# Patient Record
Sex: MALE | Race: WHITE
[De-identification: names, ages, dates, MRNs, and addresses within clinical notes are randomized per-mention and may not be internally consistent; named-entity substitution may affect disease eponyms.]

---

## 2018-01-17 ENCOUNTER — HOSPITAL ENCOUNTER (INPATIENT)
Dept: HOSPITAL 62 - ER | Age: 73
LOS: 3 days | Discharge: HOME HEALTH | DRG: 857 | End: 2018-01-20
Attending: SURGERY | Admitting: SURGERY
Payer: MEDICARE

## 2018-01-17 DIAGNOSIS — T81.4XXA: Primary | ICD-10-CM

## 2018-01-17 DIAGNOSIS — Z87.891: ICD-10-CM

## 2018-01-17 DIAGNOSIS — Z80.1: ICD-10-CM

## 2018-01-17 DIAGNOSIS — L02.211: ICD-10-CM

## 2018-01-17 DIAGNOSIS — Z90.49: ICD-10-CM

## 2018-01-17 DIAGNOSIS — Z85.038: ICD-10-CM

## 2018-01-17 DIAGNOSIS — Z93.3: ICD-10-CM

## 2018-01-17 LAB
ADD MANUAL DIFF: NO
ALBUMIN SERPL-MCNC: 3.9 G/DL (ref 3.5–5)
ALP SERPL-CCNC: 83 U/L (ref 38–126)
ALT SERPL-CCNC: 21 U/L (ref 21–72)
ANION GAP SERPL CALC-SCNC: 13 MMOL/L (ref 5–19)
AST SERPL-CCNC: 20 U/L (ref 17–59)
BASOPHILS # BLD AUTO: 0.1 10^3/UL (ref 0–0.2)
BASOPHILS NFR BLD AUTO: 0.5 % (ref 0–2)
BILIRUB DIRECT SERPL-MCNC: 0.3 MG/DL (ref 0–0.4)
BILIRUB SERPL-MCNC: 0.4 MG/DL (ref 0.2–1.3)
BUN SERPL-MCNC: 22 MG/DL (ref 7–20)
CALCIUM: 9.5 MG/DL (ref 8.4–10.2)
CHLORIDE SERPL-SCNC: 100 MMOL/L (ref 98–107)
CO2 SERPL-SCNC: 24 MMOL/L (ref 22–30)
EOSINOPHIL # BLD AUTO: 0 10^3/UL (ref 0–0.6)
EOSINOPHIL NFR BLD AUTO: 0.3 % (ref 0–6)
ERYTHROCYTE [DISTWIDTH] IN BLOOD BY AUTOMATED COUNT: 15.2 % (ref 11.5–14)
GLUCOSE SERPL-MCNC: 109 MG/DL (ref 75–110)
HCT VFR BLD CALC: 36.2 % (ref 37.9–51)
HGB BLD-MCNC: 12.1 G/DL (ref 13.5–17)
LYMPHOCYTES # BLD AUTO: 0.7 10^3/UL (ref 0.5–4.7)
LYMPHOCYTES NFR BLD AUTO: 7.2 % (ref 13–45)
MCH RBC QN AUTO: 27.9 PG (ref 27–33.4)
MCHC RBC AUTO-ENTMCNC: 33.4 G/DL (ref 32–36)
MCV RBC AUTO: 83 FL (ref 80–97)
MONOCYTES # BLD AUTO: 1.2 10^3/UL (ref 0.1–1.4)
MONOCYTES NFR BLD AUTO: 11.4 % (ref 3–13)
NEUTROPHILS # BLD AUTO: 8.4 10^3/UL (ref 1.7–8.2)
NEUTS SEG NFR BLD AUTO: 80.6 % (ref 42–78)
PLATELET # BLD: 324 10^3/UL (ref 150–450)
POTASSIUM SERPL-SCNC: 4.5 MMOL/L (ref 3.6–5)
PROT SERPL-MCNC: 7.2 G/DL (ref 6.3–8.2)
RBC # BLD AUTO: 4.34 10^6/UL (ref 4.35–5.55)
SODIUM SERPL-SCNC: 136.8 MMOL/L (ref 137–145)
TOTAL CELLS COUNTED % (AUTO): 100 %
WBC # BLD AUTO: 10.4 10^3/UL (ref 4–10.5)

## 2018-01-17 PROCEDURE — 36415 COLL VENOUS BLD VENIPUNCTURE: CPT

## 2018-01-17 PROCEDURE — 87040 BLOOD CULTURE FOR BACTERIA: CPT

## 2018-01-17 PROCEDURE — G0378 HOSPITAL OBSERVATION PER HR: HCPCS

## 2018-01-17 PROCEDURE — 87077 CULTURE AEROBIC IDENTIFY: CPT

## 2018-01-17 PROCEDURE — 80053 COMPREHEN METABOLIC PANEL: CPT

## 2018-01-17 PROCEDURE — 96365 THER/PROPH/DIAG IV INF INIT: CPT

## 2018-01-17 PROCEDURE — 99284 EMERGENCY DEPT VISIT MOD MDM: CPT

## 2018-01-17 PROCEDURE — 96375 TX/PRO/DX INJ NEW DRUG ADDON: CPT

## 2018-01-17 PROCEDURE — 93005 ELECTROCARDIOGRAM TRACING: CPT

## 2018-01-17 PROCEDURE — 87075 CULTR BACTERIA EXCEPT BLOOD: CPT

## 2018-01-17 PROCEDURE — 85025 COMPLETE CBC W/AUTO DIFF WBC: CPT

## 2018-01-17 PROCEDURE — 87070 CULTURE OTHR SPECIMN AEROBIC: CPT

## 2018-01-17 PROCEDURE — 93010 ELECTROCARDIOGRAM REPORT: CPT

## 2018-01-17 PROCEDURE — 87186 SC STD MICRODIL/AGAR DIL: CPT

## 2018-01-17 PROCEDURE — 74177 CT ABD & PELVIS W/CONTRAST: CPT

## 2018-01-17 PROCEDURE — 87205 SMEAR GRAM STAIN: CPT

## 2018-01-17 NOTE — ER DOCUMENT REPORT
ED Wound





- General


Chief Complaint: Incision Problem


Stated Complaint: PAIN/REDNESS AT SURGICAL SITE


Time Seen by Provider: 01/17/18 18:47


Mode of Arrival: Wheelchair


Information source: Patient, Relative


Notes: 





Patient is a 72-year-old male who presents to the ER today for redness, pain to 

an incision site where he had mesh removed a month ago at [facility].  Patient 

states that he had a hernia with mesh placed 3 years ago and a "kept getting 

infected" the last month he had the mesh removed.  Patient states he has been 

doing fine until yesterday whenever he had a fever of 102F and started 

vomiting.  He states at that time he did notice that there was some redness and 

pain to the top of the incision on his abdomen.  He denies any drainage.  He 

does also have a permanent colostomy, denies any purulence leaking from that.


TRAVEL OUTSIDE OF THE [place] IN LAST 30 DAYS: No





- Related Data


Allergies/Adverse Reactions: 


 





No Known Allergies Allergy (Verified 01/17/18 18:17)


 











Past Medical History





- General


Information source: Patient, Relative





- Social History


Smoking Status: Former Smoker


Chew tobacco use (# tins/day): No


Frequency of alcohol use: None


Drug Abuse: None


Family History: Reviewed & Not Pertinent


Patient has suicidal ideation: No


Patient has homicidal ideation: No





- Past Medical History


Cardiac Medical History: Reports: Hx Heart Murmur - newly Dx'ed, no MVP per 

echo DR. Candelario 4/25/12


   Denies: Hx Atrial Fibrillation, Hx Congestive Heart Failure, Hx Coronary 

Artery Disease, Hx Heart Attack, Hx Hypercholesterolemia, Hx Hypertension, Hx 

Peripheral Vascular Disease


Pulmonary Medical History: 


   Denies: Hx Asthma, Hx Tuberculosis


Neurological Medical History: Denies: Hx Cerebrovascular Accident, Hx Seizures


Renal/ Medical History: Denies: Hx Peritoneal Dialysis


Malignancy Medical History: Reports Hx Colorectal Cancer, Denies Hx Leukemia


GI Medical History: Denies: Hx Crohn's Disease, Hx Gastroesophageal Reflux 

Disease, Hx Hepatitis, Hx Hiatal Hernia, Hx Irritable Bowel, Hx Liver Failure, 

Hx Pancreatitis, Hx Ulcer


Musculoskeltal Medical History: Reports Hx Musculoskeletal Deformity, Reports 

Hx Musculoskeletal Trauma


Traumatic Medical History: Reports: Hx Fractures - left femur right wrist


Infectious Medical History: Denies: Hx Hepatitis, Hx HIV


Past Surgical History: Reports: Hx Abdominal Surgery - hernia repair and mesh 

removal, Hx Appendectomy, Hx Bowel Surgery - colostomy, Hx Orthopedic Surgery - 

rt femur left wrist.  Denies: Hx Cholecystectomy, Hx Colostomy, Hx Coronary 

Artery Bypass Graft, Hx Gastric Bypass Surgery, Hx Herniorrhaphy, Hx Open Heart 

Surgery, Hx Pacemaker, Hx Tonsillectomy





- Immunizations


Immunizations up to date: Yes


Hx Diphtheria, Pertussis, Tetanus Vaccination: Yes





Review of Systems





- Review of Systems


Constitutional: See HPI


EENT: No symptoms reported


Cardiovascular: No symptoms reported


Respiratory: No symptoms reported


Gastrointestinal: See HPI


Genitourinary: No symptoms reported


Male Genitourinary: No symptoms reported


Musculoskeletal: No symptoms reported


Skin: See HPI


Hematologic/Lymphatic: No symptoms reported


Neurological/Psychological: No symptoms reported





Physical Exam





- Vital signs


Vitals: 


 











Temp Pulse Resp BP Pulse Ox


 


 102.1 F H  140 H  20   123/76   94 


 


 01/17/18 18:22  01/17/18 18:22  01/17/18 18:22  01/17/18 18:22  01/17/18 18:22














- Notes


Notes: 





PHYSICAL EXAMINATION: 


GENERAL: Well-appearing and in no acute distress. 


HEAD: Atraumatic, normocephalic. 


EYES: Pupils equal round and reactive to light, extraocular movements intact, 

sclera anicteric, conjunctiva are normal. 


NECK: Normal range of motion, supple without lymphadenopathy 


LUNGS: CTAB and equal. No wheezes rales or rhonchi. 


HEART: Regular rate and rhythm without murmurs


ABDOMEN: Soft, large vertical healed incision site to the mid abdomen, superior 

portion with erythema surrounding and small, 1-1/2 cm fluctuant area, tender to 

palpation. No guarding, no rebound 


EXTREMITIES: Normal range of motion, no pitting edema. No cyanosis. 


NEUROLOGICAL: Cranial nerves grossly intact. Normal sensory/motor exams. 


PSYCH: Normal mood, normal affect. 


SKIN: Warm, Dry, normal turgor, no rashes or lesions noted 

















Course





- Re-evaluation


Re-evalutation: 





01/17/18 22:43


Lab work is unremarkable including a normal white blood cell count.  Patient's 

abscess did burst on its own here in the emergency department and a large 

amount of purulence drained.  CAT scan reports a deep abdominal wall abscess, 

but not into the abdominal cavity.  Dr. Goetz, surgeon on-call did evaluate 

the patient at bedside with me, and wants to take him to the OR for a surgical 

incision and drainage.  Patient's fever did reduce here with Tylenol and 

patient did start vomiting here but Zofran also relieve those symptoms.  I did 

start Unasyn early on before the CAT scan which the surgeon was pleased with. 


01/17/18 22:45








- Vital Signs


Vital signs: 


 











Temp Pulse Resp BP Pulse Ox


 


 102.1 F H  140 H  20   123/76   94 


 


 01/17/18 18:22  01/17/18 18:22  01/17/18 18:22  01/17/18 18:22  01/17/18 18:22














- Laboratory


Result Diagrams: 


 01/17/18 18:52





 01/17/18 18:52


Laboratory results interpreted by me: 


 











  01/17/18 01/17/18





  18:52 18:52


 


RBC  4.34 L 


 


Hgb  12.1 L 


 


Hct  36.2 L 


 


RDW  15.2 H 


 


Seg Neutrophils %  80.6 H 


 


Lymphocytes %  7.2 L 


 


Absolute Neutrophils  8.4 H 


 


Sodium   136.8 L


 


BUN   22 H














Discharge





- Discharge


Clinical Impression: 


 Abdominal wall abscess





Condition: Stable


Disposition: ADMITTED AS INPATIENT


Admitting Provider: Surgicalist


Unit Admitted: OR

## 2018-01-17 NOTE — PDOC H&P
History of Present Illness


Admission Date/PCP: 


  18 22:33





  AMY BUTLER MD





Patient complains of: abdominal wall pains


History of Present Illness: 


BRIANDA EDWARDS is a 72 year old male who had an abdominal wall mesh removed at 

[facility] in . C/O abdominal wall pains with redness at the incision 

site just above the umbilicus. This started draining in the ER. C/S were 

obtained of the drainage.C/O being cold. No fever.








Past Medical History


Cardiac Medical History: Reports: Heart Murmur - newly Dx'ed, no MVP per echo 

DR. Candelario 12


   Denies: Atrial Fibrillation, Congestive Heart Failure, Coronary Artery 

Disease, Myocardial Infarction, Hyperlipidema, Hypertension, Peripheral 

Vascular Disease


Pulmonary Medical History: 


   Denies: Asthma, Tuberculosis


Neurological Medical History: 


   Denies: Seizures


Malignancy Medical History: Reports: Colorectal Cancer


   Denies: Leukemia


GI Medical History: 


   Denies: Crohn's Disease, Gastroesophageal Reflux Disease, Hepatitis, Hiatal 

Hernia


Hematology: Reports: Anemia - required transfusion 12 years ago


   Denies: Hemophilia, Sickle Cell Disease


Infectious Medical History: 


   Denies: HIV





Past Surgical History


Past Surgical History: Reports: Appendectomy, Orthopedic Surgery - rt femur 

left wrist, Other - Permanent colostomy for colon Ca about 15 years ago


   Denies: Cholecystectomy, Colostomy, Coronary Artery Bypass Graft, Gastric 

Bypass Surgery, Herniorrhaphy, Pacemaker, Tonsillectomy





Social History


Smoking Status: Former Smoker


Frequency of Alcohol Use: None


Hx Recreational Drug Use: No


Hx Prescription Drug Abuse: No





Family History


Family History: Reviewed & Not Pertinent


Parental Family History Reviewed: Yes - father  of lung ca/emphysema


Children Family History Reviewed: No


Sibling(s) Family History Reviewed.: No





Medication/Allergy


Home Medications: 








Tramadol HCl [Ultram 50 mg Tablet] 50 mg PO ASDIR PRN #20 tablet 14 


Hydrocodone/Acetaminophen [Norco 5-325 mg Tablet] 1 tab PO Q6HP #14 tablet 10/07

/14 


Ondansetron [Zofran Odt 4 mg Tablet] 1 - 2 tab PO Q4H PRN #15 tab.rapdis 10/07/

14 








Allergies/Adverse Reactions: 


 





No Known Allergies Allergy (Verified 18 18:17)


 











Review of Systems


Constitutional: PRESENT: chills


Eyes: PRESENT: other - no vis/hearing changes


Nose, Mouth, and Throat: PRESENT: other - no sore throat


Cardiovascular: PRESENT: other - no chest pains


Respiratory: PRESENT: other - no cough


Gastrointestinal: PRESENT: abdominal pain, nausea


Genitourinary: PRESENT: other - no dysuria


Musculoskeletal: PRESENT: other - no joint swelling


Integumentary: PRESENT: other - no rash


Neurological: PRESENT: other - no seizures


Endocrine: PRESENT: cold intolerance


Hematologic/Lymphatic: PRESENT: other - no easy bruisability





Physical Exam


Vital Signs: 


 











Temp Pulse Resp BP Pulse Ox


 


 102.1 F H  140 H  20   123/76   94 


 


 18 18:22  18 18:22  18 18:22  18 18:22  18 18:22











General appearance: PRESENT: mild distress


Head exam: PRESENT: atraumatic


Eye exam: PRESENT: conjunctiva pink


Mouth exam: PRESENT: moist, tongue midline


Neck exam: PRESENT: full ROM


Respiratory exam: PRESENT: clear to auscultation mirza


Cardiovascular exam: PRESENT: RRR


Pulses: PRESENT: normal radial pulses


Vascular exam: PRESENT: normal capillary refill


GI/Abdominal exam: PRESENT: soft, tenderness - at the epigastric incision site 

with redness. Has some purulent draining at the midpart of rcchymosis., other - 

colostomy on the LLQ functioning well.Claims has another mesh around colostomy 

site


Rectal exam: PRESENT: deferred


Extremities exam: PRESENT: full ROM


Musculoskeletal exam: PRESENT: ambulatory


Neurological exam: PRESENT: alert, oriented to person, oriented to place, 

oriented to time, oriented to situation


Psychiatric exam: PRESENT: anxious


Skin exam: PRESENT: normal color, warm





Results


Impressions: 


 





Abdomen/Pelvis CT  18 19:44


IMPRESSION:  Broad thick-walled fluid collection within the abdomen along the 

deep surface of the abdominal wall.  Potentially postoperative seroma/ hematoma 

given recent mesh removal, although infection is in the differential.  This 

appears to partially decompressed into the subcutaneous tissues just above the 

level of the umbilicus, possibly draining to the skin surface.


 














Assessment & Plan





- Diagnosis


(1) Abdominal wall abscess


Is this a current diagnosis for this admission?: Yes   





(2) History of colon cancer


Is this a current diagnosis for this admission?: Yes   





- Time


Time Spent: 30 to 50 Minutes





- Inpatient Certification


Medical Necessity: Need For IV Fluids, Need for Pain Control, Need for IV 

Antibiotics, Need for Surgery





- Plan Summary


Plan Summary: 





Start IV antibiotics Unasyn


NPO 


Hydrate


Possible I&D tomorrow.

## 2018-01-17 NOTE — ER DOCUMENT REPORT
ED Medical Screen (RME)





- General


Chief Complaint: Incision Problem


Stated Complaint: PAIN/REDNESS AT SURGICAL SITE


Time Seen by Provider: 01/17/18 18:47


Notes: 





-year-old male patient with past history colon cancer and in the ostomy.  He 

had a ventral hernia with mesh in the past.  The mesh got infected and it was 

removed about 12/1/2017.  He now has an area at the upper part of the scar 

which is red, bulging and fluctuant which began on 1/14/2018.  Also began 

having a fever on that day.








I have greeted and performed a rapid initial assessment of this patient.  A 

comprehensive ED assessment and evaluation of the patient, analysis of test 

results and completion of the medical decision making process will be conducted 

by additional ED providers.


TRAVEL OUTSIDE OF THE [place] IN LAST 30 DAYS: No





- Related Data


Allergies/Adverse Reactions: 


 





No Known Allergies Allergy (Verified 01/17/18 18:17)


 











Past Medical History





- Social History


Chew tobacco use (# tins/day): No


Frequency of alcohol use: None


Drug Abuse: None





- Past Medical History


Cardiac Medical History: Reports: Hx Heart Murmur - newly Dx'ed, no MVP per 

echo DR. Candelario 4/25/12


   Denies: Hx Atrial Fibrillation, Hx Congestive Heart Failure, Hx Coronary 

Artery Disease, Hx Heart Attack, Hx Hypercholesterolemia, Hx Hypertension, Hx 

Peripheral Vascular Disease


Pulmonary Medical History: 


   Denies: Hx Asthma, Hx Tuberculosis


Neurological Medical History: Denies: Hx Cerebrovascular Accident, Hx Seizures


Renal/ Medical History: Denies: Hx Peritoneal Dialysis


Malignancy Medical History: Reports Hx Colorectal Cancer, Denies Hx Leukemia


GI Medical History: Denies: Hx Crohn's Disease, Hx Gastroesophageal Reflux 

Disease, Hx Hepatitis, Hx Hiatal Hernia, Hx Irritable Bowel, Hx Liver Failure, 

Hx Pancreatitis, Hx Ulcer


Musculoskeltal Medical History: Reports Hx Musculoskeletal Deformity, Reports 

Hx Musculoskeletal Trauma


Traumatic Medical History: Reports: Hx Fractures - left femur right wrist


Infectious Medical History: Denies: Hx Hepatitis, Hx HIV


Past Surgical History: Reports: Hx Abdominal Surgery - hernia repair and mesh 

removal, Hx Appendectomy, Hx Bowel Surgery - colostomy, Hx Orthopedic Surgery - 

rt femur left wrist.  Denies: Hx Cholecystectomy, Hx Colostomy, Hx Coronary 

Artery Bypass Graft, Hx Gastric Bypass Surgery, Hx Herniorrhaphy, Hx Open Heart 

Surgery, Hx Pacemaker, Hx Tonsillectomy





- Immunizations


Immunizations up to date: Yes


Hx Diphtheria, Pertussis, Tetanus Vaccination: Yes





Physical Exam





- Vital signs


Vitals: 





 











Temp Pulse Resp BP Pulse Ox


 


 102.1 F H  140 H  20   123/76   94 


 


 01/17/18 18:22  01/17/18 18:22  01/17/18 18:22  01/17/18 18:22  01/17/18 18:22














Course





- Vital Signs


Vital signs: 





 











Temp Pulse Resp BP Pulse Ox


 


 102.1 F H  140 H  20   123/76   94 


 


 01/17/18 18:22  01/17/18 18:22  01/17/18 18:22  01/17/18 18:22  01/17/18 18:22

## 2018-01-17 NOTE — RADIOLOGY REPORT (SQ)
EXAM DESCRIPTION:  CT ABD/PELVIS WITH IV ONLY



COMPLETED DATE/TIME:  1/17/2018 9:30 pm



REASON FOR STUDY:  post surgical abscess/abd wall



COMPARISON:   2015 CT.



TECHNIQUE:  CT scan of the abdomen and pelvis performed using helical scanning technique with dynamic
 intravenous contrast injection.  No oral contrast. Images reviewed with lung, soft tissue, and bone 
windows. Reconstructed coronal and sagittal MPR images reviewed. Delayed images for evaluation of the
 urinary system also acquired. All images stored on PACS.

All CT scanners at this facility use dose modulation, iterative reconstruction, and/or weight based d
osing when appropriate to reduce radiation dose to as low as reasonably achievable (ALARA).

[facility]: Dose Right  [facility]: CareDose    [facility]: Dose Right    [facility]: Teradose 4D    [facility]: Smart Technologies



CONTRAST TYPE AND DOSE:  contrast/concentration: Isovue 370.00 mg/ml; Total Contrast Delivered: 79.0 
ml; Total Saline Delivered: 68.1 ml



RENAL FUNCTION:  Creatinine 1.03



RADIATION DOSE:  CT Rad equipment meets quality standard of care and radiation dose reduction techniq
ues were employed. CTDIvol: 10.3 - 10.6 mGy. DLP: 1208 mGy-cm..



LIMITATIONS:  None.



FINDINGS:  ABDOMINAL WALL: Along the deep surface of the ventral abdominal wall musculature is a thic
k-walled broad flat fluid collection which measures up to 16 cm transverse dimension by 9 cm cranioca
udal.  Maximal thickness close to 1.7 cm, however there is probable communication with a small 3 cm s
ubcutaneous collection just above and to the right of the umbilicus.  This appears to extend towards 
the skin surface, possibly a draining wound.  Key images of this are saved to PACS for future review.


LOWER CHEST: Mild elevation left hemidiaphragm.  Clear lung bases.  Small hiatal hernia.

LIVER: Normal size. No masses.  No dilated ducts.

SPLEEN: Normal size. No focal lesions.

PANCREAS: No masses. No significant calcifications. No adjacent inflammation or peripancreatic fluid 
collections. Pancreatic duct not dilated.

GALLBLADDER: No identified stones by CT criteria. No inflammatory changes to suggest cholecystitis.

ADRENAL GLANDS: No significant masses or asymmetry.

RIGHT KIDNEY AND URETER: No solid mass or overt obstruction.

LEFT KIDNEY AND URETER: No solid mass.  Slight hydronephrosis is suggested.  A clear point of obstruc
tion is not otherwise demonstrated, however.

AORTA AND VESSELS: Atherosclerotic aorta.  Borderline aneurysmal dilatation just above bifurcation.  
Aorta measures 3 cm.  Slight dilatation of the right common iliac artery.  No venous clot detected.

RETROPERITONEUM: No retroperitoneal adenopathy, hemorrhage or masses.

BOWEL AND PERITONEAL CAVITY: Status post distal colectomy with left lower quadrant colostomy.  Region
al herniated small bowel loops along the ostomy defect.  No mechanical bowel obstruction.

APPENDIX: Not visualized.

PELVIS: Chronic presacral thickening status post distal colectomy.  Bladder unremarkable.  No pelvic 
mass or overt adenopathy.

BONES: No fracture or bone lesion.

OTHER: No other significant finding.



IMPRESSION:  Broad thick-walled fluid collection within the abdomen along the deep surface of the abd
ominal wall.  Potentially postoperative seroma/ hematoma given recent mesh removal, although infectio
n is in the differential.  This appears to partially decompressed into the subcutaneous tissues just 
above the level of the umbilicus, possibly draining to the skin surface.



TECHNICAL DOCUMENTATION:  JOB ID:  [id number]

Quality ID # 436: Final reports with documentation of one or more dose reduction techniques (e.g., Au
tomated exposure control, adjustment of the mA and/or kV according to patient size, use of iterative 
reconstruction technique)

 2011 [employer]- All Rights Reserved

## 2018-01-18 PROCEDURE — 0J980ZZ DRAINAGE OF ABDOMEN SUBCUTANEOUS TISSUE AND FASCIA, OPEN APPROACH: ICD-10-PCS | Performed by: SURGERY

## 2018-01-18 RX ADMIN — MORPHINE SULFATE PRN MG: 10 INJECTION INTRAMUSCULAR; INTRAVENOUS; SUBCUTANEOUS at 19:29

## 2018-01-18 RX ADMIN — MORPHINE SULFATE PRN MG: 10 INJECTION INTRAMUSCULAR; INTRAVENOUS; SUBCUTANEOUS at 23:34

## 2018-01-18 RX ADMIN — AMPICILLIN SODIUM AND SULBACTAM SODIUM SCH GM: 2; 1 INJECTION, POWDER, FOR SOLUTION INTRAMUSCULAR; INTRAVENOUS at 14:03

## 2018-01-18 RX ADMIN — AMPICILLIN SODIUM AND SULBACTAM SODIUM SCH GM: 2; 1 INJECTION, POWDER, FOR SOLUTION INTRAMUSCULAR; INTRAVENOUS at 03:17

## 2018-01-18 RX ADMIN — AMPICILLIN SODIUM AND SULBACTAM SODIUM SCH GM: 2; 1 INJECTION, POWDER, FOR SOLUTION INTRAMUSCULAR; INTRAVENOUS at 21:36

## 2018-01-18 RX ADMIN — AMPICILLIN SODIUM AND SULBACTAM SODIUM SCH GM: 2; 1 INJECTION, POWDER, FOR SOLUTION INTRAMUSCULAR; INTRAVENOUS at 09:18

## 2018-01-18 RX ADMIN — FENTANYL CITRATE ONE MCG: 50 INJECTION INTRAMUSCULAR; INTRAVENOUS at 18:40

## 2018-01-18 RX ADMIN — FENTANYL CITRATE ONE MCG: 50 INJECTION INTRAMUSCULAR; INTRAVENOUS at 18:35

## 2018-01-18 NOTE — OPERATIVE REPORT
Operative Report


DATE OF SURGERY: 01/18/18


PREOPERATIVE DIAGNOSIS: Abdominal wall abscess


POSTOPERATIVE DIAGNOSIS: Abdominal wall abscess


OPERATION: Excisional debridement of the abdominal wall abscess


SURGEON: JUANJO VARGAS


ANESTHESIA: GA


TISSUE REMOVED OR ALTERED: Pus submitted for Gram stain and culture


COMPLICATIONS: 





None


ESTIMATED BLOOD LOSS: 20 cc


INTRAOPERATIVE FINDINGS: Several centimeters above the umbilicus a small skin 

opening with underlying tract going between 2 abdominal layers into an 

approximately 10 x 15 cm abscess cavity between the anterior and posterior 

layer.  Cavity filled with pus.


PROCEDURE: 





Informed consent was obtained.  Patient was brought to the operating room 

placed in the operating table in supine position.  After satisfactory induction 

of general anesthesia patient's abdomen was prepped and draped in usual sterile 

fashion.  At the midline several centimeters above the umbilicus there was a 

small skin opening this opening was widened by excising a sliver of skin 

exposing 1/2 cm track extending deep through a very dense layer of connective 

tissue.  About a 2 cm x 1 cm piece of this dense connective tissue was excised 

to allow digital probing of the abscess cavity.  The abscess cavity measured 

about 10x 15 cm in size.  The floor of the abscess cavity felt firm with the 

feel of fascia.  And the roof of the abscess cavity also felt very firm with 

the feel of fascia.  My best guess was that the patient had developed a seroma 

between the anterior and the posterior fascia which subsequently became 

infected and eroded anteriorly out thru the anterior fascia and the skin.  To 

allow drainage I excised a approximately 5  x 3  cm section of skin.  The 

defect in the anterior fascia was widened circumferentially to about 2 x 3 cm 

in size. The abscess cavity was copiously irrigated with peroxide and saline 

and irrigate certain fluid was completely aspirated out. A wound VAC was then 

placed with the tip of the sponge positioned through the anterior fascial 

defect.  Patient tolerated the procedure well with no apparent complications 

and was taken to the recovery area in stable condition.

## 2018-01-18 NOTE — PDOC PROGRESS REPORT
Subjective


Progress Note for:: 01/18/18


Subjective:: 


abdominal drainage





Reason For Visit: 


ABSCESS OF ABDOMINAL WALL








Physical Exam


Vital Signs: 


 











Temp Pulse Resp BP Pulse Ox


 


 98.3 F   70   17   127/57 H  96 


 


 01/18/18 08:14  01/18/18 08:14  01/18/18 08:14  01/18/18 08:14  01/18/18 08:14








 Intake & Output











 01/17/18 01/18/18 01/19/18





 06:59 06:59 06:59


 


Weight  78.2 kg 











General appearance: PRESENT: no acute distress, cooperative


Respiratory exam: PRESENT: clear to auscultation mirza


Cardiovascular exam: PRESENT: RRR


GI/Abdominal exam: PRESENT: other - Supraumbilical midline punctate opening 

with seropurulent drainage with underlying fluctuance and surrounding erythema.





Results


Impressions: 


 





Abdomen/Pelvis CT  01/17/18 19:44


IMPRESSION:  Broad thick-walled fluid collection within the abdomen along the 

deep surface of the abdominal wall.  Potentially postoperative seroma/ hematoma 

given recent mesh removal, although infection is in the differential.  This 

appears to partially decompressed into the subcutaneous tissues just above the 

level of the umbilicus, possibly draining to the skin surface.


 














Assessment & Plan





- Diagnosis


(1) Abdominal wall abscess


Is this a current diagnosis for this admission?: Yes   


Plan: 


In patient with prior history of ventral hernia status post mesh repair 

complicated by mesh infection.  Most recently underwent mesh removal.  Now with 

surgical site infection.  We will plan to do debridement in the OR today.  I 

have discussed with the patient risks and benefits of the procedure including 

risk of prolonged wound healing, recurrent infection, bleeding.  Patient 

understands and agrees to proceed.

## 2018-01-18 NOTE — EKG REPORT
SEVERITY:- OTHERWISE NORMAL ECG -

SINUS RHYTHM

VENTRICULAR PREMATURE COMPLEX

LOW VOLTAGE IN FRONTAL LEADS

:

Confirmed by: Indigo Rodriguez 18-Jan-2018 17:05:26

## 2018-01-19 RX ADMIN — AMPICILLIN SODIUM AND SULBACTAM SODIUM SCH GM: 2; 1 INJECTION, POWDER, FOR SOLUTION INTRAMUSCULAR; INTRAVENOUS at 20:03

## 2018-01-19 RX ADMIN — AMPICILLIN SODIUM AND SULBACTAM SODIUM SCH GM: 2; 1 INJECTION, POWDER, FOR SOLUTION INTRAMUSCULAR; INTRAVENOUS at 17:02

## 2018-01-19 RX ADMIN — MORPHINE SULFATE PRN MG: 10 INJECTION INTRAMUSCULAR; INTRAVENOUS; SUBCUTANEOUS at 06:01

## 2018-01-19 RX ADMIN — MORPHINE SULFATE PRN MG: 10 INJECTION INTRAMUSCULAR; INTRAVENOUS; SUBCUTANEOUS at 11:59

## 2018-01-19 RX ADMIN — AMPICILLIN SODIUM AND SULBACTAM SODIUM SCH GM: 2; 1 INJECTION, POWDER, FOR SOLUTION INTRAMUSCULAR; INTRAVENOUS at 02:04

## 2018-01-19 RX ADMIN — AMPICILLIN SODIUM AND SULBACTAM SODIUM SCH GM: 2; 1 INJECTION, POWDER, FOR SOLUTION INTRAMUSCULAR; INTRAVENOUS at 08:33

## 2018-01-19 NOTE — PDOC PROGRESS REPORT
Subjective


Progress Note for:: 01/19/18


Subjective:: 





no c/o, tolerating po well, bowel function present


Reason For Visit: 


ABSCESS OF ABDOMINAL WALL








Physical Exam


Vital Signs: 


 











Temp Pulse Resp BP Pulse Ox


 


 99.0 F   70   18   155/63 H  94 


 


 01/19/18 11:44  01/19/18 11:44  01/19/18 11:44  01/19/18 11:44  01/19/18 11:44








 Intake & Output











 01/18/18 01/19/18 01/20/18





 06:59 06:59 06:59


 


Intake Total  1144 


 


Output Total  560 


 


Balance  584 


 


Weight 78.2 kg  











GI/Abdominal exam: PRESENT: soft, other - clean, covered with woundvac, no 

erythema or edema





Results


Impressions: 


 





Abdomen/Pelvis CT  01/17/18 19:44


IMPRESSION:  Broad thick-walled fluid collection within the abdomen along the 

deep surface of the abdominal wall.  Potentially postoperative seroma/ hematoma 

given recent mesh removal, although infection is in the differential.  This 

appears to partially decompressed into the subcutaneous tissues just above the 

level of the umbilicus, possibly draining to the skin surface.


 














Assessment & Plan





- Diagnosis


(1) Abdominal wall abscess


Is this a current diagnosis for this admission?: Yes   





- Plan Summary


Plan Summary: 





Continue IV abx


Waiting for final cx results with sensitivity


Patient can be d/c to home  on oral antibiotics once cx final results are 

available


Discharge planning consult for outpatient WoundVac management

## 2018-01-20 VITALS — SYSTOLIC BLOOD PRESSURE: 127 MMHG | DIASTOLIC BLOOD PRESSURE: 57 MMHG

## 2018-01-20 RX ADMIN — AMPICILLIN SODIUM AND SULBACTAM SODIUM SCH GM: 2; 1 INJECTION, POWDER, FOR SOLUTION INTRAMUSCULAR; INTRAVENOUS at 08:40

## 2018-01-20 RX ADMIN — AMPICILLIN SODIUM AND SULBACTAM SODIUM SCH GM: 2; 1 INJECTION, POWDER, FOR SOLUTION INTRAMUSCULAR; INTRAVENOUS at 02:56

## 2018-01-20 RX ADMIN — AMPICILLIN SODIUM AND SULBACTAM SODIUM SCH GM: 2; 1 INJECTION, POWDER, FOR SOLUTION INTRAMUSCULAR; INTRAVENOUS at 15:03

## 2018-01-20 NOTE — PDOC PROGRESS REPORT
Subjective


Progress Note for:: 01/20/18


Subjective:: 





no c/o, tolerating po well, bowel function present


Reason For Visit: 


ABSCESS OF ABDOMINAL WALL








Physical Exam


Vital Signs: 


 











Temp Pulse Resp BP Pulse Ox


 


 97.4 F   72   16   160/97 H  98 


 


 01/20/18 08:38  01/20/18 08:38  01/20/18 08:38  01/20/18 08:38  01/20/18 08:38








 Intake & Output











 01/19/18 01/20/18 01/21/18





 06:59 06:59 06:59


 


Intake Total 1144 1451 


 


Output Total 560 660 


 


Balance 584 791 


 


Weight  86.1 kg 











Respiratory exam: PRESENT: clear to auscultation mirza


Cardiovascular exam: PRESENT: RRR


GI/Abdominal exam: PRESENT: soft, other - wound covered with woundvac, no 

erythema or edema





Results


Impressions: 


 





Abdomen/Pelvis CT  01/17/18 19:44


IMPRESSION:  Broad thick-walled fluid collection within the abdomen along the 

deep surface of the abdominal wall.  Potentially postoperative seroma/ hematoma 

given recent mesh removal, although infection is in the differential.  This 

appears to partially decompressed into the subcutaneous tissues just above the 

level of the umbilicus, possibly draining to the skin surface.


 














Assessment & Plan





- Diagnosis


(1) Abdominal wall abscess


Is this a current diagnosis for this admission?: Yes   





- Plan Summary


Plan Summary: 





s/p I&D abdominal wall abscess


doing well


 Cx significant for propionobacterium





P/


Home today


f/u with [place] Surgeon in 1-2 weeks


f/u with Dr. Rangel if patient cannot see Surgeon in [place]


Doxycycline 100 mg po qd x 14 days


WoundVac to be replace as per routine by Home care Nurse


resume home meds and diet


activities as tolerated, be mindful of WoundVac

## 2018-01-20 NOTE — DISCHARGE SUMMARY E
Discharge Summary



NAME: BRIANDA EDWARDS

MRN:  [id number]        : 1945     AGE: 72Y

ADMITTED: 2018                  DISCHARGED: 2018



FINAL DIAGNOSIS:

Abdominal wall abscess.



PROCEDURE:

On , the patient underwent incision and drainage of abdominal wall

abscess with wound VAC placement.  The procedure was uneventful.  The

patient was transferred to the floor.  His postop course was uneventful as

well.  The patient was kept on IV antibiotic Zosyn and the wound VAC was

properly maintained during the hospitalization.  His vital signs remained

stable.  On the day of discharge the patient had no complaints and was 
tolerating 

p.o. well.  His vital signs are stable.  His blood work was within

normal limits.  Culture from the abdominal wound revealed 

Propionobacterium sensitive to tetracyclines.



DISCHARGE INSTRUCTIONS:

The patient was discharged home and instructed to take his home medications 
plus doxycycline

100 mg p.o. daily for 14 days.  He was given a follow up appointment with

his surgeon in [place], where the patient had the original surgery for

his ventral hernia, and also he was given an alternative follow up appointment 
with Dr. Rangel within 1-2 weeks if the patient could not see his surgeon in [place].  The patient was given instruction to resume regular diet, activities as 
tolerated, 

sponge bath only, and the prescribed

antibiotics.   A home care nurse service was arranged for home and

maintenance of the wound VAC.



DICTATING PHYSICIAN:  LUCIA SAUCEDO M.D.





[id number]                  DT: 2018

PHY#: [id number]            DD: 2018    1228

ID:   [id number]           JOB#: [id number]       ACCT: [id number]



cc:JULIA HODGES M.D.

>







MTDD

## 2018-05-30 ENCOUNTER — HOSPITAL ENCOUNTER (EMERGENCY)
Dept: HOSPITAL 62 - ER | Age: 73
Discharge: HOME | End: 2018-05-30
Payer: MEDICARE

## 2018-05-30 VITALS — SYSTOLIC BLOOD PRESSURE: 162 MMHG | DIASTOLIC BLOOD PRESSURE: 78 MMHG

## 2018-05-30 DIAGNOSIS — T38.0X5A: ICD-10-CM
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
## 2018-05-30 DIAGNOSIS — Z87.891: ICD-10-CM

## 2018-05-30 DIAGNOSIS — L50.9: Primary | ICD-10-CM

## 2018-05-30 DIAGNOSIS — T37.8X5A: ICD-10-CM

## 2018-05-30 PROCEDURE — 96374 THER/PROPH/DIAG INJ IV PUSH: CPT

## 2018-05-30 PROCEDURE — 96376 TX/PRO/DX INJ SAME DRUG ADON: CPT

## 2018-05-30 PROCEDURE — 99283 EMERGENCY DEPT VISIT LOW MDM: CPT

## 2018-05-30 PROCEDURE — S0028 INJECTION, FAMOTIDINE, 20 MG: HCPCS

## 2018-05-30 PROCEDURE — 96375 TX/PRO/DX INJ NEW DRUG ADDON: CPT

## 2018-05-30 NOTE — ER DOCUMENT REPORT
ED Allergic Reaction





- General


Mode of Arrival: Ambulatory


Information source: Patient


TRAVEL OUTSIDE OF THE [place] IN LAST 30 DAYS: No





- General


Chief Complaint: Allergic Reaction


Stated Complaint: ALLERGIC REACTION


Time Seen by Provider: 05/30/18 15:37


Notes: 


Patient is a 73 year old male with colorectal cancer presents to the emergency 

department complaining of a possible allergic reaction. Patient states he was 

recently diagnosed with bronchitis today and prescribed Levaquin and Prednisone 

without an inhaler. Patient states approximately 15 minutes after taking these 

medications he began to itch excessively and develop a red rash. Patient states 

he has never taking either medication before but has taken Cipro. 


 (TYRESE,TAMAYA)





- Related Data


Allergies/Adverse Reactions: 


 





No Known Allergies Allergy (Verified 05/30/18 15:05)


 











Past Medical History





- General


Information source: Patient





- Social History


Smoking Status: Former Smoker


Cigarette use (# per day): No


Chew tobacco use (# tins/day): No


Smoking Education Provided: No


Family History: Reviewed & Not Pertinent





- Past Medical History


Cardiac Medical History: Reports: Hx Heart Murmur - newly Dx'ed, no MVP per 

echo DR. Candelario 4/25/12


Malignancy Medical History: Reports Hx Colorectal Cancer


Musculoskeltal Medical History: Reports Hx Musculoskeletal Deformity, Reports 

Hx Musculoskeletal Trauma


Traumatic Medical History: Reports: Hx Fractures - left femur right wrist


Past Surgical History: Reports: Hx Abdominal Surgery - hernia repair and mesh 

removal, Hx Appendectomy, Hx Bowel Surgery - colostomy, Hx Orthopedic Surgery - 

rt femur left wrist, Other - Permanent colostomy for colon Ca about 15 years ago





- Immunizations


Immunizations up to date: Yes


Hx Diphtheria, Pertussis, Tetanus Vaccination: Yes





Review of Systems





- Review of Systems


Constitutional: See HPI


EENT: No symptoms reported


Cardiovascular: No symptoms reported


Respiratory: No symptoms reported


Gastrointestinal: No symptoms reported


Genitourinary: No symptoms reported


Male Genitourinary: No symptoms reported


Musculoskeletal: See HPI


Skin: No symptoms reported


Hematologic/Lymphatic: No symptoms reported


Neurological/Psychological: No symptoms reported


-: Yes All other systems reviewed and negative





Physical Exam





- General


General appearance: Appears well, Alert


In distress: None





- HEENT


Head: Normocephalic, Atraumatic


Eyes: Normal


Conjunctiva: Normal


Extraocular movements intact: Yes


Pupils: PERRL


Neck: Normal





- Respiratory


Respiratory status: No respiratory distress


Chest status: Nontender





- Cardiovascular


Rhythm: Regular


Heart sounds: Normal auscultation


Murmur: No


Friction rub: No


Gallop: None auscultated





- Abdominal


Inspection: Normal, Other - colostmy bag, binder for ventral hernia


Distension: No distension


Bowel sounds: Normal


Tenderness: Nontender


Organomegaly: No organomegaly





- Back


Back: Normal





- Extremities


General upper extremity: Normal ROM


General lower extremity: Normal ROM.  No: Edema





- Neurological


Neuro grossly intact: Yes


Cognition: Normal


Orientation: AAOx4


[place] Coma Scale Eye Opening: Spontaneous


[place] Coma Scale Verbal: Oriented


[place] Coma Scale Motor: Obeys Commands


[place] Coma Scale Total: 15


Speech: Normal





- Psychological


Associated symptoms: Normal affect, Normal mood





- Skin


Skin Temperature: Warm


Skin Moisture: Dry


Skin Color: Erythema - diffuse blanching erythema across the anterior chest 

blanching


Skin irregularity: Rash


Location of irregularity: Chest





- Vital signs


Vitals: 


 











Temp Pulse Resp BP Pulse Ox


 


 97.5 F   100   24 H  90/48 L  96 


 


 05/30/18 15:19  05/30/18 15:19  05/30/18 15:19  05/30/18 15:19  05/30/18 15:19














Course





- Re-evaluation


Re-evalutation: 





05/30/18 17:04


At this time the itching in the chest and abdomen is gone and the erythema that 

was quite pronounced earlier is completely gone.  He does have some itching 

down in his calves of his legs.  Give him an additional dose of the Pepcid only 

as he was sound asleep possibly from the Benadryl.


05/30/18 18:16


Patient reports itching is completely gone now and he is anxious to go home.  

He does have Benadryl at home and has Zantac at home.


He will stop the Levaquin and prednisone at this time.  He will continue taking 

his Mucinex tablets.  He will be prescribed doxycycline for his bronchitis, as 

he has taken doxycycline in the past without problems. (MEME BYRNE)





- Vital Signs


Vital signs: 


 











Temp Pulse Resp BP Pulse Ox


 


 97.5 F   100   23 H  121/60   96 


 


 05/30/18 15:19  05/30/18 15:19  05/30/18 17:01  05/30/18 17:01  05/30/18 17:01














Discharge





- Discharge


Clinical Impression: 


 Urticaria





Allergic reaction caused by a drug


Qualifiers:


 Encounter type: initial encounter Qualified Code(s): T78.40XA - Allergy, 

unspecified, initial encounter





Condition: Stable


Disposition: HOME, SELF-CARE


Additional Instructions: 


Acute Allergic Reaction





   Your symptoms are due to an allergic reaction.  Allergy can cause hives, 

swelling of the hands, feet, and face, hoarseness, and difficulty swallowing or 

breathing.  It may be due to exposure to medication, animal dander, foods, 

infection, or insect bites.  Medication is a common cause, even when prior use 

of this same medication caused no problems. 


   Acute treatment may include adrenalin and antihistamines.  Usually, the 

specific allergic agent can't be identified unless repeated episodes occur.


   Home treatment includes the following:


   (1) Stop any suspicious medications.  This will be discussed with you.


   (2) Oral antihistamines for the next four to five days.  Example, 

diphenhydramine (Benadryl) every four hours.


   (3) You may also use cimetidine (Tagamet), ranitidine (Zantac), or 

famotidine (Pepcid) every four hours if diphenhydramine is not controlling 

itching and hives.


   (4) Avoid aspirin until the hives completely disappear.


   (5) Avoid hot bahs or showers until the hives are completely gone. 


   Call the doctor if faintness, difficulty swallowing, tightness in the chest, 

or wheezing occurs.








********************************************************************************

************





Stop taking the prednisone and the Levaquin.


Start the Doxycycline tomorrow.


Take Zantac 150 mg to 300 mg every 8 hours for the next 24 hours.


Take Benadryl 25 mg at bedtime tonight.


Follow-up with your doctor tomorrow if not better.





RETURN TO THE EMERGENCY ROOM IF ANY NEW OR WORSENING SYMPTOMS.








Prescriptions: 


Doxycycline Hyclate 100 mg PO BID #14 tablet


Referrals: 


AMY BUTLER MD [Primary Care Provider] - Follow up as needed


Scribe Attestation: 





05/30/18 17:01


I personally performed the services described in the documentation, reviewed 

and edited the documentation which was dictated to the scribe in my presence, 

and it accurately records my words and actions. (MEME BYRNE)





Scribe Documentation





- Scribe


Written by Josee:: Jaden Robles, 5/30/2018 16:11


acting as scribe for :: Christina